# Patient Record
Sex: FEMALE | URBAN - METROPOLITAN AREA
[De-identification: names, ages, dates, MRNs, and addresses within clinical notes are randomized per-mention and may not be internally consistent; named-entity substitution may affect disease eponyms.]

---
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
## 2021-12-03 ENCOUNTER — APPOINTMENT (RX ONLY)
Dept: URBAN - METROPOLITAN AREA CLINIC 140 | Facility: CLINIC | Age: 21
Setting detail: DERMATOLOGY
End: 2021-12-03

## 2021-12-03 DIAGNOSIS — L30.9 DERMATITIS, UNSPECIFIED: ICD-10-CM | Status: INADEQUATELY CONTROLLED

## 2021-12-03 DIAGNOSIS — D22 MELANOCYTIC NEVI: ICD-10-CM

## 2021-12-03 PROBLEM — D22.5 MELANOCYTIC NEVI OF TRUNK: Status: ACTIVE | Noted: 2021-12-03

## 2021-12-03 PROBLEM — D22.4 MELANOCYTIC NEVI OF SCALP AND NECK: Status: ACTIVE | Noted: 2021-12-03

## 2021-12-03 PROCEDURE — 99214 OFFICE O/P EST MOD 30 MIN: CPT

## 2021-12-03 PROCEDURE — ? PRESCRIPTION

## 2021-12-03 PROCEDURE — ? ADDITIONAL NOTES

## 2021-12-03 PROCEDURE — ? COUNSELING

## 2021-12-03 RX ORDER — CLOBETASOL PROPIONATE 0.5 MG/G
CREAM TOPICAL BID
Qty: 60 | Refills: 3 | Status: ERX | COMMUNITY
Start: 2021-12-03

## 2021-12-03 RX ADMIN — CLOBETASOL PROPIONATE: 0.5 CREAM TOPICAL at 00:00

## 2021-12-03 ASSESSMENT — LOCATION ZONE DERM
LOCATION ZONE: TRUNK
LOCATION ZONE: HAND
LOCATION ZONE: FINGER
LOCATION ZONE: NECK

## 2021-12-03 ASSESSMENT — LOCATION DETAILED DESCRIPTION DERM
LOCATION DETAILED: RIGHT ULNAR DORSAL HAND
LOCATION DETAILED: LEFT INFERIOR ANTERIOR NECK
LOCATION DETAILED: LEFT PROXIMAL PALMAR MIDDLE FINGER
LOCATION DETAILED: RIGHT CLAVICULAR SKIN

## 2021-12-03 ASSESSMENT — LOCATION SIMPLE DESCRIPTION DERM
LOCATION SIMPLE: RIGHT HAND
LOCATION SIMPLE: RIGHT CLAVICULAR SKIN
LOCATION SIMPLE: LEFT ANTERIOR NECK
LOCATION SIMPLE: LEFT MIDDLE FINGER

## 2021-12-03 NOTE — PROCEDURE: ADDITIONAL NOTES
Additional Notes: Suspect irritant vs. allergic contact dermatitis vs. psoriasis vs. dyshidrotic eczema vs. other. Start clobetasol 0.05% cream BID x 1-2 weeks, PRN flares. At night recommend pt apply under vaseline and occlusion with gloves. Sensitive skin care regimen reviewed. Avoid excessive hand washing, hand  and fragrant soaps. Moisturize with each hand washing. F/u in 3-4 weeks, sooner if worsening. \\nDiscussed that hand eczema is chronic in nature and if left untreated can lead to infections like impetigo or cellulitis or require immunosuppressive medications as treatment.
Detail Level: Simple
Render Risk Assessment In Note?: yes

## 2021-12-03 NOTE — PROCEDURE: MIPS QUALITY
Quality 431: Preventive Care And Screening: Unhealthy Alcohol Use - Screening: Patient screened for unhealthy alcohol use using a single question and scores less than 2 times per year
Quality 226: Preventive Care And Screening: Tobacco Use: Screening And Cessation Intervention: Patient screened for tobacco use and is an ex/non-smoker
Detail Level: Detailed
Quality 110: Preventive Care And Screening: Influenza Immunization: Influenza Immunization not Administered for Documented Reasons.
Quality 155 (Denominator): Falls Plan Of Care: Plan of Care not Documented, Reason not Otherwise Specified
Quality 154 Part A: Falls: Risk Assessment (Should Be Reported With Measure 155.): Falls risk assessment completed and documented in the past 12 months.
Quality 47: Advance Care Plan: Advance Care Planning discussed and documented in the medical record; patient did not wish or was not able to name a surrogate decision maker or provide an advance care plan.
Quality 154 Part B: Falls: Risk Screening (Should Be Reported With Measure 155.): Patient screened for future fall risk; documentation of no falls in the past year or only one fall without injury in the past year
Quality 431: Preventive Care And Screening: Unhealthy Alcohol Use - Screening: Patient not identified as an unhealthy alcohol user when screened for unhealthy alcohol use using a systematic screening method
Quality 111:Pneumonia Vaccination Status For Older Adults: Pneumococcal Vaccination not Administered or Previously Received, Reason not Otherwise Specified

## 2023-03-31 ENCOUNTER — OFFICE VISIT (OUTPATIENT)
Dept: URBAN - METROPOLITAN AREA CLINIC 43 | Facility: CLINIC | Age: 23
End: 2023-03-31
Payer: COMMERCIAL

## 2023-03-31 DIAGNOSIS — E11.9 TYPE 2 DIABETES MELLITUS W/O COMPLICATION: Primary | ICD-10-CM

## 2023-03-31 DIAGNOSIS — H52.223 REGULAR ASTIGMATISM, BILATERAL: ICD-10-CM

## 2023-03-31 PROCEDURE — 99204 OFFICE O/P NEW MOD 45 MIN: CPT

## 2023-03-31 ASSESSMENT — KERATOMETRY
OD: 43.00
OS: 43.13

## 2023-03-31 ASSESSMENT — VISUAL ACUITY: OD: 20/20

## 2023-03-31 ASSESSMENT — INTRAOCULAR PRESSURE
OS: 20
OD: 19

## 2023-03-31 NOTE — IMPRESSION/PLAN
Impression: Regular astigmatism, bilateral: H52.223. Plan: Released SRx to pt. Impact resistant lenses and AR coating recommended. RTC if unable to adapt.

## 2023-03-31 NOTE — IMPRESSION/PLAN
Impression: Type 2 diabetes mellitus w/o complication: N60.1. Plan: Ed pt on clinical findings. DWP that there is no retinopathy or macular edema present on exam today. Reminded pt that ADA recommends target A1c of 7.0 or less to minimize risk of retinopathy as well as other systemic complications of DM. Advised pt to RTC if vision changes. Recommend yearly DFE w/ fundus photos and MAC OCT to detect early signs of diabetic retinopathy. Advised pt to visit with PCP regularly, will send today's notes to PCP.

## 2023-05-23 NOTE — PROCEDURE: MEDICATION COUNSELING
Nutrition Assessment   Reason for Consult/Assessment: Reassessment   Diagnosis and Hx: Reviewed    Pertinent Nutrition History: Depression    Pertinent Nutrition Information: Patient reports that she has had a poor appetite for \"a while\". While inpatient she reports that \"every time I ate I got nauseous\", denies any nausea now. Shared that her appetite has improved since entering [facility].  Writer assessed for eating disorder behaviors, patient denied all.    Intake from yesterday, 5/11/23:   Breakfast  Pancake   Lunch  Mac and cheese, cornbread Dinner  1 slice pizza and hot dog   Snack     Snack   Snack       5/22 intake:   Breakfast  Egg sandwich, cereal   Lunch  spaghetti Dinner  Rice and chicken at 4pm     5/23 intake:   Breakfast  Skipped   Lunch  Izquierdo and cheese quesadilla Dinner  TBD       Diet Order:  (self select)   Diet tolerance: Not documented   Food Allergies: None known    Nutrition Intervention/Monitoring Eval   Intervention: Coordination of nutrition care by a nutrition professional   Goal: Meet >/equal 75% estimated needs   Intervention goal status: Some progress toward goal  Time frame to achieve goal: Ongoing   Dietitian will monitor: Food and nutrition knowledge, Food and beverage intake     Nutrition Diagnosis / PES  Nutrition Diagnosis: Inadequate oral intake  Related to: Psychological/psychiatric issues   As evidenced by: Diet history/recall, Weight loss over time   Primary Nutrition Diagnosis status: Active nutrition diagnosis     Nutrition Education  Nutrition Problem:  (Inadequate oral intake)  Related to:  (decreased appetite secondary to depression)   As evidenced by: Diet history/recall (weight loss over time)   Primary Nutrition Diagnosis status: Active nutrition diagnosis    Education:(Dealing with loss of appetite)  and weight gain MNT  Writer provided education on dealing with loss of appetite. Discussed the brain-gut connection and how appetite can be affected by changes in mood.  Educated on mechanical eating skills to restore normalized hunger and fullness. Discussed small frequent high protein meals with food before fluids to prevent early satiety; benefits of standard oral supplement; encouraging meal and snack frequency; increased fluids between meals.     Writer also educated on weight gain MNT. Discussed calorically dense foods vs high volume and low calorie foods. Also stressed the importance of small, frequent meals to prevent early satiety.     5/23  Patient reports that \"everytime I eat, I get nauseous and I have been dry heaving daily\". Discussed her intake in food for the past 2 weeks. Reiterated prior education on weight gain MNT. Encouraged patient to have breakfast daily and to also eat a night snack before bed. Patient reports that there are not snack foods at home to have before bed, so brainstormed different ideas.     Goal: Meet >/equal 75% estimated needs   Intervention goal status: Some progress toward goal   Time Frame to Achieve Goal: Ongoing   Dietitian will monitor: Food and beverage intake, Food and knowedge or skill     TREATMENT PLAN   1.Education: Dealing with loss of appetite and weight gain MNT    2. Patient to consume breakfast and night snack daily       Malnutrition Status: Unable to assess          Niacinamide Counseling: I recommended taking niacin or niacinamide, also know as vitamin B3, twice daily. Recent evidence suggests that taking vitamin B3 (500 mg twice daily) can reduce the risk of actinic keratoses and non-melanoma skin cancers. Side effects of vitamin B3 include flushing and headache.

## 2024-04-18 ENCOUNTER — OFFICE VISIT (OUTPATIENT)
Dept: URBAN - METROPOLITAN AREA CLINIC 43 | Facility: CLINIC | Age: 24
End: 2024-04-18
Payer: COMMERCIAL

## 2024-04-18 DIAGNOSIS — Z79.84 LONG TERM (CURRENT) USE OF ORAL HYPOGLYCEMIC DRUGS: ICD-10-CM

## 2024-04-18 DIAGNOSIS — H52.223 REGULAR ASTIGMATISM, BILATERAL: ICD-10-CM

## 2024-04-18 DIAGNOSIS — E11.9 TYPE 2 DIABETES MELLITUS W/O COMPLICATION: Primary | ICD-10-CM

## 2024-04-18 DIAGNOSIS — H04.123 DRY EYE SYNDROME OF BILATERAL LACRIMAL GLANDS: ICD-10-CM

## 2024-04-18 PROCEDURE — 99214 OFFICE O/P EST MOD 30 MIN: CPT | Performed by: OPTOMETRIST

## 2024-04-18 ASSESSMENT — VISUAL ACUITY
OD: 20/20
OS: 20/20

## 2024-04-18 ASSESSMENT — INTRAOCULAR PRESSURE
OD: 15
OS: 15

## 2024-04-18 ASSESSMENT — KERATOMETRY
OD: 43.13
OS: 42.88

## 2025-06-18 NOTE — PROCEDURE: MEDICATION COUNSELING
Birth Control Pills Pregnancy And Lactation Text: This medication should be avoided if pregnant and for the first 30 days post-partum. English